# Patient Record
Sex: MALE | Race: WHITE | URBAN - METROPOLITAN AREA
[De-identification: names, ages, dates, MRNs, and addresses within clinical notes are randomized per-mention and may not be internally consistent; named-entity substitution may affect disease eponyms.]

---

## 2018-08-28 ENCOUNTER — OFFICE VISIT (OUTPATIENT)
Dept: FAMILY MEDICINE | Facility: CLINIC | Age: 53
End: 2018-08-28

## 2018-08-28 VITALS
HEART RATE: 76 BPM | WEIGHT: 227 LBS | RESPIRATION RATE: 16 BRPM | HEIGHT: 69 IN | BODY MASS INDEX: 33.62 KG/M2 | SYSTOLIC BLOOD PRESSURE: 128 MMHG | DIASTOLIC BLOOD PRESSURE: 80 MMHG

## 2018-08-28 DIAGNOSIS — M25.552 HIP PAIN, LEFT: Primary | ICD-10-CM

## 2018-08-28 PROCEDURE — 99213 OFFICE O/P EST LOW 20 MIN: CPT | Performed by: NURSE PRACTITIONER

## 2018-08-28 PROCEDURE — 73522 X-RAY EXAM HIPS BI 3-4 VIEWS: CPT | Performed by: NURSE PRACTITIONER

## 2018-08-28 RX ORDER — CYCLOBENZAPRINE HCL 10 MG
5 TABLET ORAL 3 TIMES DAILY PRN
Qty: 45 TABLET | Refills: 1 | Status: SHIPPED | OUTPATIENT
Start: 2018-08-28 | End: 2018-09-11

## 2018-08-28 NOTE — PROGRESS NOTES
"  SUBJECTIVE:   John Rice is a 53 year old male who presents to clinic today for the following health issues: Left hip/buttock pain radiating into thigh x 2 days, labor intensive job, bending and lifting, no exercise outside of work. Twisting to right causes pain in thigh, standing painful. Does not remember a specific injury.      Musculoskeletal problem/pain      Duration: x5 days    Description  Location: left hip    Intensity:  mild, worse with moving around or standing at work, 1-10/10    Accompanying signs and symptoms: radiation of pain to from back left buttocks to groin area uperleg, tingling, weakness of leg and warmth    History  Previous similar problem: no   Previous evaluation:  none    Precipitating or alleviating factors:  Trauma or overuse: YES- overuse at work - physical job  Aggravating factors include: standing, walking, lifting and overuse    Therapies tried and outcome: rest/inactivity, Ibuprofen and deep heating rub      Problem list and histories reviewed & adjusted, as indicated.  Additional history: as documented    There is no problem list on file for this patient.    No past surgical history on file.    Social History   Substance Use Topics     Smoking status: Former Smoker     Types: Cigarettes     Quit date: 1/1/2009     Smokeless tobacco: Never Used     Alcohol use Yes     No family history on file.        Reviewed and updated as needed this visit by clinical staff       Reviewed and updated as needed this visit by Provider         ROS:  Constitutional, HEENT, cardiovascular, pulmonary, gi and gu systems are negative, except as otherwise noted.    OBJECTIVE:     /80  Pulse 76  Resp 16  Ht 1.74 m (5' 8.5\")  Wt 103 kg (227 lb)  BMI 34.01 kg/m2  Body mass index is 34.01 kg/(m^2).  GENERAL: healthy, alert and no distress  ABDOMEN: soft, nontender, no hepatosplenomegaly, no masses and bowel sounds normal  MS: no gross musculoskeletal defects noted, no edema  MS: decreased " range of motion turning to right and tenderness to palpation over left hip/buttock  NEURO: Normal strength and tone, mentation intact and speech normal  PSYCH: mentation appears normal, affect normal/bright    Diagnostic Test Results:  Xray - normal    ASSESSMENT/PLAN:       1. Hip pain, left  Stretch gently, warm heat to hip,  TID x 14 days  - X-ray bl Hip G/E 2 vws-appears normal, radiologist to read  - PHYSICAL THERAPY REFERRAL  - cyclobenzaprine (FLEXERIL) 10 MG tablet; Take 0.5 tablets (5 mg) by mouth 3 times daily as needed for muscle spasms  Dispense: 45 tablet; Refill: 1    FUTURE APPOINTMENTS:       - Follow-up for annual visit or as needed  Work on weight loss  Regular exercise    DIANA Haywood CNP  [facility]

## 2018-08-28 NOTE — LETTER
[facility]  [address] [place] [address] [phone]      August 28, 2018      John Rice  [address]  [facility] [address]      EMERGENCY CARE PLAN  Presenting Problem Treatment Plan   Questions or concerns during clinic hours I will call the clinic directly:    [facility]  [address] [place], [place] [address] [phone]   Questions or concerns outside clinic hours I will call the after-hours on-call line at [phone]   Patient needs to schedule an appointment I will call the scheduling team during business hours at [phone]   Same day treatment  I will call the clinic first, then urgent care and express care if needed   Clinic Care Coordinators SARAHY Benjamin:  [phone]  Kinza Armstrong RN: [phone]   Crisis Services:  Behavioral or Mental Health [facility] ([facility])   [phone]   Emergency treatment--Immediately CALL 111

## 2018-08-28 NOTE — MR AVS SNAPSHOT
"              After Visit Summary   8/28/2018    John Rice    MRN: [id number]           Patient Information     Date Of Birth          1965        Visit Information        Provider Department      8/28/2018 12:30 PM Carmela Rhodes APRN CNP [facility]        Today's Diagnoses     Hip pain, left    -  1       Follow-ups after your visit        Additional Services     PHYSICAL THERAPY REFERRAL       *This therapy referral will be filtered to a centralized scheduling office at [facility] and the patient will receive a call to schedule an appointment at a [facility] location most convenient for them. *     [facility] provides Physical Therapy evaluation and treatment and many specialty services across the [facility] system.  If requesting a specialty program, please choose from the list below.    If you have not heard from the scheduling office within 2 business days, please call [phone] for all locations, with the exception of [place], please call [phone] and [facility], please call [phone]  Treatment: Evaluation & Treatment  Special Instructions/Modalities:   Special Programs: None    Please be aware that coverage of these services is subject to the terms and limitations of your health insurance plan.  Call member services at your health plan with any benefit or coverage questions.      **Note to Provider:  If you are referring outside of [facility] for the therapy appointment, please list the name of the location in the \"special instructions\" above, print the referral and give to the patient to schedule the appointment.                  Who to contact     If you have questions or need follow up information about today's clinic visit or your schedule please contact [facility] directly at [phone].  Normal or non-critical lab and imaging results will be communicated to you by MyChart, letter or phone within 4 business " "days after the clinic has received the results. If you do not hear from us within 7 days, please contact the clinic through [employer] or phone. If you have a critical or abnormal lab result, we will notify you by phone as soon as possible.  Submit refill requests through [employer] or call your pharmacy and they will forward the refill request to us. Please allow 3 business days for your refill to be completed.          Additional Information About Your Visit        [employer] Information     [employer] lets you send messages to your doctor, view your test results, renew your prescriptions, schedule appointments and more. To sign up, go to [email or web address] . Click on \"Log in\" on the left side of the screen, which will take you to the Welcome page. Then click on \"Sign up Now\" on the right side of the page.     You will be asked to enter the access code listed below, as well as some personal information. Please follow the directions to create your username and password.     Your access code is: [email or web address]  Expires: 2018  1:05 PM     Your access code will  in 90 days. If you need help or a new code, please call your [facility] clinic or [phone].        Care EveryWhere ID     This is your Care EveryWhere ID. This could be used by other organizations to access your [facility] medical records  [id number]        Your Vitals Were     Pulse Respirations Height BMI (Body Mass Index)          76 16 1.74 m (5' 8.5\") 34.01 kg/m2         Blood Pressure from Last 3 Encounters:   18 128/80   07/14/15 130/88   14 118/75    Weight from Last 3 Encounters:   18 103 kg (227 lb)   07/14/15 104.3 kg (230 lb)   14 99.3 kg (219 lb)              We Performed the Following     PHYSICAL THERAPY REFERRAL     X-ray bl Hip G/E 2 vws          Today's Medication Changes          These changes are accurate as of 18  1:05 PM.  If you have any questions, ask your nurse or doctor.               Start taking " these medicines.        Dose/Directions    cyclobenzaprine 10 MG tablet   Commonly known as:  FLEXERIL   Used for:  Hip pain, left   Started by:  Carmela Rhodes APRN CNP        Dose:  5 mg   Take 0.5 tablets (5 mg) by mouth 3 times daily as needed for muscle spasms   Quantity:  45 tablet   Refills:  1            Where to get your medicines      These medications were sent to [facility] [id number] [place] [address] [address] AT [facility] [address], [facility] [address]     Phone:  [phone]     cyclobenzaprine 10 MG tablet                Primary Care Provider Office Phone # Fax #    Ameya Cuevas [phone] [address]  [facility] [address]        Equal Access to Services     [facility] : Hadii carmelo murillo hadasho Soomaali, waaxda luqadaha, qaybta kaalmada adeegyada, erik cross hayboni garnica . So [facility] [phone].    ATENCIÓN: Si habla español, tiene a hernandez disposición servicios gratuitos de asistencia lingüística. Llame al [phone].    We comply with applicable federal civil rights laws and [place] laws. We do not discriminate on the basis of race, color, national origin, age, disability, sex, sexual orientation, or gender identity.            Thank you!     Thank you for choosing [facility]  for your care. Our goal is always to provide you with excellent care. Hearing back from our patients is one way we can continue to improve our services. Please take a few minutes to complete the written survey that you may receive in the mail after your visit with us. Thank you!             Your Updated Medication List - Protect others around you: Learn how to safely use, store and throw away your medicines at www.disposemymeds.org.          This list is accurate as of 8/28/18  1:05 PM.  Always use your most recent med list.                   Brand Name Dispense Instructions for use Diagnosis    ADVIL PO      Take 400  mg by mouth 3 times daily as needed for moderate pain        cyclobenzaprine 10 MG tablet    FLEXERIL    45 tablet    Take 0.5 tablets (5 mg) by mouth 3 times daily as needed for muscle spasms    Hip pain, left